# Patient Record
Sex: MALE | Race: WHITE | NOT HISPANIC OR LATINO | Employment: PART TIME | URBAN - METROPOLITAN AREA
[De-identification: names, ages, dates, MRNs, and addresses within clinical notes are randomized per-mention and may not be internally consistent; named-entity substitution may affect disease eponyms.]

---

## 2024-03-19 ENCOUNTER — HOSPITAL ENCOUNTER (EMERGENCY)
Facility: CLINIC | Age: 49
Discharge: HOME | End: 2024-03-19
Attending: EMERGENCY MEDICINE | Admitting: EMERGENCY MEDICINE

## 2024-03-19 VITALS
TEMPERATURE: 97.8 F | RESPIRATION RATE: 18 BRPM | BODY MASS INDEX: 27.09 KG/M2 | OXYGEN SATURATION: 95 % | HEART RATE: 98 BPM | SYSTOLIC BLOOD PRESSURE: 160 MMHG | DIASTOLIC BLOOD PRESSURE: 114 MMHG | WEIGHT: 172.62 LBS | HEIGHT: 67 IN

## 2024-03-19 DIAGNOSIS — J06.9 VIRAL URI: ICD-10-CM

## 2024-03-19 LAB
FLUAV RNA SPEC QL NAA+PROBE: NEGATIVE
FLUBV RNA RESP QL NAA+PROBE: NEGATIVE
GROUP A STREP BY PCR: NOT DETECTED
RSV RNA SPEC NAA+PROBE: NEGATIVE
SARS-COV-2 RNA RESP QL NAA+PROBE: NEGATIVE

## 2024-03-19 PROCEDURE — 87651 STREP A DNA AMP PROBE: CPT | Performed by: EMERGENCY MEDICINE

## 2024-03-19 PROCEDURE — 87637 SARSCOV2&INF A&B&RSV AMP PRB: CPT | Performed by: EMERGENCY MEDICINE

## 2024-03-19 PROCEDURE — 99283 EMERGENCY DEPT VISIT LOW MDM: CPT

## 2024-03-19 RX ORDER — ALBUTEROL SULFATE 90 UG/1
2 AEROSOL, METERED RESPIRATORY (INHALATION) EVERY 6 HOURS PRN
Qty: 18 G | Refills: 0 | Status: SHIPPED | OUTPATIENT
Start: 2024-03-19

## 2024-03-19 RX ORDER — INHALER, ASSIST DEVICES
1 SPACER (EA) MISCELLANEOUS ONCE
Status: DISCONTINUED | OUTPATIENT
Start: 2024-03-19 | End: 2024-03-19 | Stop reason: HOSPADM

## 2024-03-19 ASSESSMENT — COLUMBIA-SUICIDE SEVERITY RATING SCALE - C-SSRS
6. HAVE YOU EVER DONE ANYTHING, STARTED TO DO ANYTHING, OR PREPARED TO DO ANYTHING TO END YOUR LIFE?: NO
1. IN THE PAST MONTH, HAVE YOU WISHED YOU WERE DEAD OR WISHED YOU COULD GO TO SLEEP AND NOT WAKE UP?: NO
2. HAVE YOU ACTUALLY HAD ANY THOUGHTS OF KILLING YOURSELF IN THE PAST MONTH?: NO

## 2024-03-19 ASSESSMENT — ACTIVITIES OF DAILY LIVING (ADL): ADLS_ACUITY_SCORE: 33

## 2024-03-19 NOTE — DISCHARGE INSTRUCTIONS
You were seen today for evaluation of cough. Your viral panel was negative for influenza/COVID/RSV. Your strep PCR was negative for strep pharyngitis.     What to do next:  - Use albuterol inhaler every 6 hours as needed.   - Stay hydrated, get plenty of rest.  - You can take ibuprofen and tylenol as needed.   - Follow up with your primary care provider for reassessment.  - Return to ER with worsening cough, fever, chest pain, shortness of breath, or any other concerning symptoms.     Discharge Instructions  Upper Respiratory Infection    The upper respiratory tract includes the sinuses, nasal passages, pharynx, and larynx. A URI, or upper respiratory infection, is an infection of any of the parts of the upper airway. Symptoms include runny nose, congestion, sneezing, sore throat, cough, and fever. URIs are almost always caused by a virus. Antibiotics do not help with viral infections, so are generally not prescribed. A URI is very contagious through coughing and nasal secretions; make sure you wash your hands often and clean surfaces after sneezing, coughing or touching them. While you should start to improve in 3 - 5 days, remember that sometimes a cough can linger for several weeks.    Generally, every Emergency Department visit should have a follow-up clinic visit with either a primary or a specialty clinic/provider. Please follow-up as instructed by your emergency provider today.    Return to the Emergency Department if:  Any of your symptoms get much worse.  You seem very sick, like being too weak to get up.  You have chest pain or shortness of breath.   You have a severe headache.  You are vomiting (throwing up) so much you cannot keep fluids or medicines down.  You have confusion or seem unusually drowsy.  You have a seizure.    What can I do to help myself?  Fill any prescriptions the provider gave you and take them right away  If you have a fever, get plenty of rest and drink lots of fluids, especially  water.  Using a humidifier or saline nose spray will also help loosen mucous.   Clothes or blankets will not change your fever. Do what is comfortable for you.  Bathing or sponging in lukewarm water may help you feel better.  Acetaminophen (Tylenol ) or ibuprofen (Advil , Motrin ) will help bring fever down and may help you feel more comfortable. Be sure to read and follow the package directions, and ask your provider if you have questions.  Do not drink alcohol.  Decongestants may help you feel better. You may use decongestant nose sprays Afrin  (oxymetazoline) or -Synephrine  (phenylephrine hydrochloride) for up to 3 days, or may use a decongestant tablet like Sudafed  (pseudoephedrine).  If you were given a prescription for medicine here today, be sure to read all of the information (including the package insert) that comes with your prescription.  This will include important information about the medicine, its side effects, and any warnings that you need to know about.  The pharmacist who fills the prescription can provide more information and answer questions you may have about the medicine.  If you have questions or concerns that the pharmacist cannot address, please call or return to the Emergency Department.   Remember that you can always come back to the Emergency Department if you are not able to see your regular provider in the amount of time listed above, if you get any new symptoms, or if there is anything that worries you.

## 2024-03-19 NOTE — ED TRIAGE NOTES
Pt arrives ambulatory for cough and dizziness since Saturday. Has been taking theraflu and tylenol and ibuprofen.

## 2024-03-19 NOTE — ED PROVIDER NOTES
ED ATTENDING PHYSICIAN NOTE:   I evaluated this patient in conjunction with Marko Avilez PA-C  I have participated in the care of the patient and personally performed key elements of the history, exam, and medical decision making.      HPI:   Jose Dixon is an otherwise healthy 48 year old male who presents with a cough and slight dizziness since 4 days ago. Other recent symptoms include a subjective fever, chills, and some chest heaviness with his cough. Then today, he has been experiencing worsening nasal congestion. He endorses being a smoker, but does not have a history of COPD. His daughter is also current sick with similar symptoms.     Independent Historian:   None - Patient Only    Review of External Notes:   None       EXAM:   General:  Alert, interactive  Cardiovascular:  Well perfused  Lungs:  No respiratory distress, no accessory muscle use.  Lungs are clear to auscultation without any evidence of wheezes or crackles.  Neuro:  Moving all 4 extremities  Skin:  Warm, dry  Psych:  Normal affect      Independent Interpretation (X-rays, CTs, rhythm strip):  None    Consultations/Discussion of Management or Tests:  None     Social Determinants of Health affecting care:   None     MEDICAL DECISION MAKING/ASSESSMENT AND PLAN:   48-year-old male with no prior medical problems.  He presents today with cough, subjective fevers and chills.  Daughters had similar symptoms.  He has been symptomatic over the last 4 days.  No recorded fevers.  He has intermittent cough on exam no crackles or wheezing noted.  O2 saturation at 95% on room air.  COVID, flu, RSV negative.  He reports no chest pain at rest.  No concerns for ACS.  I discussed plan for supportive care.  Discussed care instructions close follow-up PCP.  They are comfortable with plan of care. All questions and concerns addressed at this time.      DIAGNOSIS:     ICD-10-CM    1. Viral URI  J06.9              DISPOSITION:   The patient was discharged to  home.      Scribe Disclosure:  I, Braeden Cazares, am serving as a scribe at 9:12 AM on 3/19/2024 to document services personally performed by Abigail Lo DO based on my observations and the provider's statements to me.   3/19/2024  [facility] EMERGENCY DEPT     Abigail Lo DO  03/19/24 1151

## 2024-03-19 NOTE — ED PROVIDER NOTES
"    History     Chief Complaint:  Cough       HPI   Jose Dixon is a 48 year old male who presents to the ED for cough.  Patient states that he's 4 days of productive cough, fever, rhinorrhea, congestion, and chest pressure with coughing.  Patient's daughter has had similar symptoms for the last 12 days.  He denies any nausea, vomiting, abdominal pain, diarrhea, urinary symptoms.  No history of COPD.  Patient does smoke half a pack a day currently.  No recent travel.      Independent Historian:    Patient only.  Wife and daughter at bedside.    Review of External Notes:  None.    Medications:    albuterol (PROAIR HFA/PROVENTIL HFA/VENTOLIN HFA) 108 (90 Base) MCG/ACT inhaler        Past Medical History:    No past medical history on file.    Past Surgical History:    No past surgical history on file.       Physical Exam   Patient Vitals for the past 24 hrs:   BP Temp Temp src Pulse Resp SpO2 Height Weight   03/19/24 0713 (!) 160/114 97.8  F (36.6  C) Oral 98 18 95 % 1.702 m (5' 7\") 78.3 kg (172 lb 9.9 oz)        Physical Exam  Physical Exam:  General: no acute distress  Head: normocephalic, atraumatic  Eyes: No conjunctivitis, drainage  Ears: bilateral TMs appear normal, no erythema, bulging or drainage of ear canals.   Nose: no signs of bleeding, or drainage  Throat: moist mucous membranes, no erythema, exudate, or drainage of the posterior oropharynx.   CV: RRR, no murmur/gallop/rubs  Pulm: lungs clear to ausculation bilaterally, normal respiratory effort, normal chest expansion with breathing   Abdomen: soft, non-tender, non-distended, no rigidity or guarding, normal BS  MSK: No cervical tenderness, no midline tenderness  Ext: No gross deformities  Skin: Warm, dry, no rashes  Neuro: A&O x3, normal speech. No focal neurologic deficits.  Psych: Appropriate mood. Cooperative      Emergency Department Course   ECG  None    Imaging:  No orders to display       Laboratory:  Labs Ordered and Resulted from Time of " ED Arrival to Time of ED Departure   INFLUENZA A/B, RSV, & SARS-COV2 PCR - Normal       Result Value    Influenza A PCR Negative      Influenza B PCR Negative      RSV PCR Negative      SARS CoV2 PCR Negative     GROUP A STREPTOCOCCUS PCR THROAT SWAB - Normal    Group A strep by PCR Not Detected          Procedures   None    Emergency Department Course & Assessments:    Interventions:  Medications - No data to display     Assessments:  See ED course.    Independent Interpretation (X-rays, CTs, rhythm strip):  None    Consultations/Discussion of Management or Tests:  ED Course as of 03/19/24 1607   Tue Mar 19, 2024   0910 Evaluated patient and obtained history. Discussed negative viral panel and strep test.        Social Determinants of Health affecting care:  None     Disposition:  The patient was discharged.    Impression & Plan        Medical Decision Making:  Patient presents as stated above.  Vitals show elevated blood pressure otherwise normal.  Broad differential was considered including pneumonia, COVID, strep pharyngitis, viral URI.  Patient endorses  having 4 days of upper respiratory symptoms including productive cough, fevers, congestion, rhinorrhea, chest pressure with coughing.  Patient's daughter has also had similar symptoms for the last 12 days.  Patient is a current smoker but has no history of COPD.  Lungs are clear on exam. No signs of fluid overload. Patient is not toxic appearing. Afebrile. Viral panel was negative for COVID/influenza/RSV.  Strep PCR was negative.  Suspect viral URI.  Discussed supportive cares including rest, fluids, Tylenol and ibuprofen as needed.  Also gave albuterol inhaler given that he is a smoker.  Discussed follow-up with his primary care physician.  Patient is agreeable to plan of care and is okay to discharge at this time. Return precautions given.       Diagnosis:    ICD-10-CM    1. Viral URI  J06.9            Discharge Medications:  Discharge Medication List as of  3/19/2024  9:38 AM        START taking these medications    Details   albuterol (PROAIR HFA/PROVENTIL HFA/VENTOLIN HFA) 108 (90 Base) MCG/ACT inhaler Inhale 2 puffs into the lungs every 6 hours as needed for shortness of breath, wheezing or cough, Disp-18 g, R-0, [facility] may dispense brand covered by insurance (Proair, or proventil or ventolin or generic albuterol inhaler)                Marko Avilez PA-C  3/19/2024                Marko Avilez PA-C  03/19/24 1608

## 2024-03-19 NOTE — LETTER
March 19, 2024      To Whom It May Concern:      Jose Dixon was seen in our Emergency Department today, 03/19/24.  I expect his condition to improve over the next 3 days.  He may return to workwhen improved.    Sincerely,    Abigail Lo DO